# Patient Record
Sex: MALE | Race: WHITE | NOT HISPANIC OR LATINO | Employment: FULL TIME | URBAN - METROPOLITAN AREA
[De-identification: names, ages, dates, MRNs, and addresses within clinical notes are randomized per-mention and may not be internally consistent; named-entity substitution may affect disease eponyms.]

---

## 2020-01-15 ENCOUNTER — OFFICE VISIT CONVERTED (OUTPATIENT)
Dept: SURGERY | Facility: CLINIC | Age: 60
End: 2020-01-15
Attending: SURGERY

## 2020-01-15 ENCOUNTER — CONVERSION ENCOUNTER (OUTPATIENT)
Dept: SURGERY | Facility: CLINIC | Age: 60
End: 2020-01-15

## 2020-01-31 ENCOUNTER — HOSPITAL ENCOUNTER (OUTPATIENT)
Dept: PERIOP | Facility: HOSPITAL | Age: 60
Setting detail: HOSPITAL OUTPATIENT SURGERY
Discharge: HOME | End: 2020-01-31
Attending: SURGERY

## 2020-02-06 ENCOUNTER — OFFICE VISIT CONVERTED (OUTPATIENT)
Dept: SURGERY | Facility: CLINIC | Age: 60
End: 2020-02-06
Attending: SURGERY

## 2020-03-09 ENCOUNTER — HOSPITAL ENCOUNTER (INPATIENT)
Facility: HOSPITAL | Age: 60
LOS: 2 days | Discharge: HOME | End: 2020-03-11
Attending: INTERNAL MEDICINE | Admitting: INTERNAL MEDICINE

## 2020-03-09 ENCOUNTER — APPOINTMENT (OUTPATIENT)
Dept: CARDIOLOGY | Facility: HOSPITAL | Age: 60
End: 2020-03-09

## 2020-03-09 PROBLEM — I21.3 STEMI (ST ELEVATION MYOCARDIAL INFARCTION) (HCC): Status: ACTIVE | Noted: 2020-03-09

## 2020-03-09 PROBLEM — I21.11 ST ELEVATION MYOCARDIAL INFARCTION INVOLVING RIGHT CORONARY ARTERY: Status: ACTIVE | Noted: 2020-03-09

## 2020-03-09 LAB
ALBUMIN SERPL-MCNC: 4.1 G/DL (ref 3.5–5.2)
ALBUMIN/GLOB SERPL: 1.8 G/DL
ALP SERPL-CCNC: 76 U/L (ref 39–117)
ALT SERPL W P-5'-P-CCNC: 11 U/L (ref 1–41)
ANION GAP SERPL CALCULATED.3IONS-SCNC: 9.7 MMOL/L (ref 5–15)
AST SERPL-CCNC: 15 U/L (ref 1–40)
BASOPHILS # BLD AUTO: 0.06 10*3/MM3 (ref 0–0.2)
BASOPHILS NFR BLD AUTO: 0.6 % (ref 0–1.5)
BILIRUB SERPL-MCNC: 0.6 MG/DL (ref 0.2–1.2)
BUN BLD-MCNC: 10 MG/DL (ref 6–20)
BUN/CREAT SERPL: 11.6 (ref 7–25)
CALCIUM SPEC-SCNC: 8.9 MG/DL (ref 8.6–10.5)
CHLORIDE SERPL-SCNC: 103 MMOL/L (ref 98–107)
CHOLEST SERPL-MCNC: 168 MG/DL (ref 0–200)
CO2 SERPL-SCNC: 25.3 MMOL/L (ref 22–29)
CREAT BLD-MCNC: 0.86 MG/DL (ref 0.76–1.27)
DEPRECATED RDW RBC AUTO: 41.3 FL (ref 37–54)
EOSINOPHIL # BLD AUTO: 0.07 10*3/MM3 (ref 0–0.4)
EOSINOPHIL NFR BLD AUTO: 0.8 % (ref 0.3–6.2)
ERYTHROCYTE [DISTWIDTH] IN BLOOD BY AUTOMATED COUNT: 12.4 % (ref 12.3–15.4)
GFR SERPL CREATININE-BSD FRML MDRD: 92 ML/MIN/1.73
GLOBULIN UR ELPH-MCNC: 2.3 GM/DL
GLUCOSE BLD-MCNC: 91 MG/DL (ref 65–99)
HBA1C MFR BLD: 5.4 % (ref 4.8–5.6)
HCT VFR BLD AUTO: 39 % (ref 37.5–51)
HDLC SERPL-MCNC: 35 MG/DL (ref 40–60)
HGB BLD-MCNC: 13.5 G/DL (ref 13–17.7)
IMM GRANULOCYTES # BLD AUTO: 0.03 10*3/MM3 (ref 0–0.05)
IMM GRANULOCYTES NFR BLD AUTO: 0.3 % (ref 0–0.5)
LDLC SERPL CALC-MCNC: 109 MG/DL (ref 0–100)
LDLC/HDLC SERPL: 3.11 {RATIO}
LYMPHOCYTES # BLD AUTO: 1.19 10*3/MM3 (ref 0.7–3.1)
LYMPHOCYTES NFR BLD AUTO: 12.8 % (ref 19.6–45.3)
MAXIMAL PREDICTED HEART RATE: 163 BPM
MCH RBC QN AUTO: 31.6 PG (ref 26.6–33)
MCHC RBC AUTO-ENTMCNC: 34.6 G/DL (ref 31.5–35.7)
MCV RBC AUTO: 91.3 FL (ref 79–97)
MONOCYTES # BLD AUTO: 0.53 10*3/MM3 (ref 0.1–0.9)
MONOCYTES NFR BLD AUTO: 5.7 % (ref 5–12)
NEUTROPHILS # BLD AUTO: 7.43 10*3/MM3 (ref 1.7–7)
NEUTROPHILS NFR BLD AUTO: 79.8 % (ref 42.7–76)
NRBC BLD AUTO-RTO: 0 /100 WBC (ref 0–0.2)
PLATELET # BLD AUTO: 180 10*3/MM3 (ref 140–450)
PMV BLD AUTO: 9.5 FL (ref 6–12)
POTASSIUM BLD-SCNC: 4.3 MMOL/L (ref 3.5–5.2)
PROT SERPL-MCNC: 6.4 G/DL (ref 6–8.5)
RBC # BLD AUTO: 4.27 10*6/MM3 (ref 4.14–5.8)
SODIUM BLD-SCNC: 138 MMOL/L (ref 136–145)
STRESS TARGET HR: 139 BPM
TRIGL SERPL-MCNC: 120 MG/DL (ref 0–150)
TROPONIN T SERPL-MCNC: 0.03 NG/ML (ref 0–0.03)
TSH SERPL DL<=0.05 MIU/L-ACNC: 1.92 UIU/ML (ref 0.27–4.2)
VLDLC SERPL-MCNC: 24 MG/DL
WBC NRBC COR # BLD: 9.31 10*3/MM3 (ref 3.4–10.8)

## 2020-03-09 PROCEDURE — B2111ZZ FLUOROSCOPY OF MULTIPLE CORONARY ARTERIES USING LOW OSMOLAR CONTRAST: ICD-10-PCS | Performed by: INTERNAL MEDICINE

## 2020-03-09 PROCEDURE — 25010000002 HEPARIN (PORCINE) PER 1000 UNITS: Performed by: INTERNAL MEDICINE

## 2020-03-09 PROCEDURE — 84484 ASSAY OF TROPONIN QUANT: CPT | Performed by: INTERNAL MEDICINE

## 2020-03-09 PROCEDURE — 80053 COMPREHEN METABOLIC PANEL: CPT | Performed by: INTERNAL MEDICINE

## 2020-03-09 PROCEDURE — 83036 HEMOGLOBIN GLYCOSYLATED A1C: CPT | Performed by: INTERNAL MEDICINE

## 2020-03-09 PROCEDURE — 85025 COMPLETE CBC W/AUTO DIFF WBC: CPT | Performed by: INTERNAL MEDICINE

## 2020-03-09 PROCEDURE — 0 IOPAMIDOL PER 1 ML: Performed by: INTERNAL MEDICINE

## 2020-03-09 PROCEDURE — 93005 ELECTROCARDIOGRAM TRACING: CPT | Performed by: INTERNAL MEDICINE

## 2020-03-09 PROCEDURE — 027035Z DILATION OF CORONARY ARTERY, ONE ARTERY WITH TWO DRUG-ELUTING INTRALUMINAL DEVICES, PERCUTANEOUS APPROACH: ICD-10-PCS | Performed by: INTERNAL MEDICINE

## 2020-03-09 PROCEDURE — C1894 INTRO/SHEATH, NON-LASER: HCPCS | Performed by: INTERNAL MEDICINE

## 2020-03-09 PROCEDURE — 25010000002 ENOXAPARIN PER 10 MG: Performed by: INTERNAL MEDICINE

## 2020-03-09 PROCEDURE — 25010000003 LIDOCAINE 1 % SOLUTION: Performed by: INTERNAL MEDICINE

## 2020-03-09 PROCEDURE — 93458 L HRT ARTERY/VENTRICLE ANGIO: CPT | Performed by: INTERNAL MEDICINE

## 2020-03-09 PROCEDURE — 25010000002 BIVALIRUDIN TRIFLUOROACETATE 250 MG RECONSTITUTED SOLUTION 1 EACH VIAL: Performed by: INTERNAL MEDICINE

## 2020-03-09 PROCEDURE — 25010000002 MIDAZOLAM PER 1MG: Performed by: INTERNAL MEDICINE

## 2020-03-09 PROCEDURE — C1769 GUIDE WIRE: HCPCS | Performed by: INTERNAL MEDICINE

## 2020-03-09 PROCEDURE — 80061 LIPID PANEL: CPT | Performed by: INTERNAL MEDICINE

## 2020-03-09 PROCEDURE — C1874 STENT, COATED/COV W/DEL SYS: HCPCS | Performed by: INTERNAL MEDICINE

## 2020-03-09 PROCEDURE — C1725 CATH, TRANSLUMIN NON-LASER: HCPCS | Performed by: INTERNAL MEDICINE

## 2020-03-09 PROCEDURE — 93306 TTE W/DOPPLER COMPLETE: CPT

## 2020-03-09 PROCEDURE — 4A023N7 MEASUREMENT OF CARDIAC SAMPLING AND PRESSURE, LEFT HEART, PERCUTANEOUS APPROACH: ICD-10-PCS | Performed by: INTERNAL MEDICINE

## 2020-03-09 PROCEDURE — C9606 PERC D-E COR REVASC W AMI S: HCPCS | Performed by: INTERNAL MEDICINE

## 2020-03-09 PROCEDURE — 25010000002 FENTANYL CITRATE (PF) 100 MCG/2ML SOLUTION: Performed by: INTERNAL MEDICINE

## 2020-03-09 PROCEDURE — 84443 ASSAY THYROID STIM HORMONE: CPT | Performed by: INTERNAL MEDICINE

## 2020-03-09 PROCEDURE — 99152 MOD SED SAME PHYS/QHP 5/>YRS: CPT | Performed by: INTERNAL MEDICINE

## 2020-03-09 PROCEDURE — C1887 CATHETER, GUIDING: HCPCS | Performed by: INTERNAL MEDICINE

## 2020-03-09 PROCEDURE — 99153 MOD SED SAME PHYS/QHP EA: CPT | Performed by: INTERNAL MEDICINE

## 2020-03-09 DEVICE — XIENCE SIERRA™ EVEROLIMUS ELUTING CORONARY STENT SYSTEM 4.00 MM X 28 MM / RAPID-EXCHANGE
Type: IMPLANTABLE DEVICE | Status: FUNCTIONAL
Brand: XIENCE SIERRA™

## 2020-03-09 DEVICE — XIENCE SIERRA™ EVEROLIMUS ELUTING CORONARY STENT SYSTEM 4.00 MM X 08 MM / RAPID-EXCHANGE
Type: IMPLANTABLE DEVICE | Status: FUNCTIONAL
Brand: XIENCE SIERRA™

## 2020-03-09 RX ORDER — MIDAZOLAM HYDROCHLORIDE 2 MG/2ML
INJECTION, SOLUTION INTRAMUSCULAR; INTRAVENOUS AS NEEDED
Status: DISCONTINUED | OUTPATIENT
Start: 2020-03-09 | End: 2020-03-09 | Stop reason: HOSPADM

## 2020-03-09 RX ORDER — ACETAMINOPHEN 325 MG/1
650 TABLET ORAL EVERY 4 HOURS PRN
Status: DISCONTINUED | OUTPATIENT
Start: 2020-03-09 | End: 2020-03-11 | Stop reason: HOSPADM

## 2020-03-09 RX ORDER — ASPIRIN 81 MG/1
81 TABLET ORAL DAILY
Status: DISCONTINUED | OUTPATIENT
Start: 2020-03-10 | End: 2020-03-11 | Stop reason: HOSPADM

## 2020-03-09 RX ORDER — ALPRAZOLAM 0.5 MG/1
0.5 TABLET ORAL 2 TIMES DAILY PRN
Status: DISCONTINUED | OUTPATIENT
Start: 2020-03-09 | End: 2020-03-11 | Stop reason: HOSPADM

## 2020-03-09 RX ORDER — ASPIRIN 325 MG
TABLET ORAL AS NEEDED
Status: DISCONTINUED | OUTPATIENT
Start: 2020-03-09 | End: 2020-03-09 | Stop reason: HOSPADM

## 2020-03-09 RX ORDER — LIDOCAINE HYDROCHLORIDE 10 MG/ML
INJECTION, SOLUTION INFILTRATION; PERINEURAL AS NEEDED
Status: DISCONTINUED | OUTPATIENT
Start: 2020-03-09 | End: 2020-03-09 | Stop reason: HOSPADM

## 2020-03-09 RX ORDER — NICOTINE 21 MG/24HR
1 PATCH, TRANSDERMAL 24 HOURS TRANSDERMAL EVERY 24 HOURS
Status: DISCONTINUED | OUTPATIENT
Start: 2020-03-09 | End: 2020-03-11 | Stop reason: HOSPADM

## 2020-03-09 RX ORDER — RAMIPRIL 5 MG/1
5 CAPSULE ORAL
Status: DISCONTINUED | OUTPATIENT
Start: 2020-03-09 | End: 2020-03-11 | Stop reason: HOSPADM

## 2020-03-09 RX ORDER — ATORVASTATIN CALCIUM 80 MG/1
80 TABLET, FILM COATED ORAL NIGHTLY
Status: DISCONTINUED | OUTPATIENT
Start: 2020-03-09 | End: 2020-03-11 | Stop reason: HOSPADM

## 2020-03-09 RX ORDER — CLOPIDOGREL BISULFATE 75 MG/1
TABLET ORAL AS NEEDED
Status: DISCONTINUED | OUTPATIENT
Start: 2020-03-09 | End: 2020-03-09 | Stop reason: HOSPADM

## 2020-03-09 RX ORDER — CLOPIDOGREL BISULFATE 75 MG/1
75 TABLET ORAL DAILY
Status: DISCONTINUED | OUTPATIENT
Start: 2020-03-10 | End: 2020-03-11 | Stop reason: HOSPADM

## 2020-03-09 RX ORDER — HYDROCODONE BITARTRATE AND ACETAMINOPHEN 5; 325 MG/1; MG/1
1 TABLET ORAL EVERY 4 HOURS PRN
Status: DISCONTINUED | OUTPATIENT
Start: 2020-03-09 | End: 2020-03-11 | Stop reason: HOSPADM

## 2020-03-09 RX ORDER — ALPRAZOLAM 0.5 MG/1
0.5 TABLET ORAL 3 TIMES DAILY PRN
Status: DISCONTINUED | OUTPATIENT
Start: 2020-03-09 | End: 2020-03-11 | Stop reason: HOSPADM

## 2020-03-09 RX ORDER — FENTANYL CITRATE 50 UG/ML
INJECTION, SOLUTION INTRAMUSCULAR; INTRAVENOUS AS NEEDED
Status: DISCONTINUED | OUTPATIENT
Start: 2020-03-09 | End: 2020-03-09 | Stop reason: HOSPADM

## 2020-03-09 RX ORDER — SODIUM CHLORIDE 9 MG/ML
100 INJECTION, SOLUTION INTRAVENOUS CONTINUOUS
Status: DISCONTINUED | OUTPATIENT
Start: 2020-03-09 | End: 2020-03-10

## 2020-03-09 RX ADMIN — HYDROCODONE BITARTRATE AND ACETAMINOPHEN 1 TABLET: 5; 325 TABLET ORAL at 18:56

## 2020-03-09 RX ADMIN — RAMIPRIL 5 MG: 5 CAPSULE ORAL at 13:01

## 2020-03-09 RX ADMIN — ENOXAPARIN SODIUM 40 MG: 40 INJECTION SUBCUTANEOUS at 13:01

## 2020-03-09 RX ADMIN — NICOTINE 1 PATCH: 21 PATCH TRANSDERMAL at 08:23

## 2020-03-09 RX ADMIN — ATORVASTATIN CALCIUM 80 MG: 80 TABLET, FILM COATED ORAL at 20:44

## 2020-03-09 RX ADMIN — SODIUM CHLORIDE 100 ML/HR: 9 INJECTION, SOLUTION INTRAVENOUS at 19:46

## 2020-03-09 RX ADMIN — SODIUM CHLORIDE 100 ML/HR: 9 INJECTION, SOLUTION INTRAVENOUS at 08:22

## 2020-03-09 RX ADMIN — ACETAMINOPHEN 650 MG: 325 TABLET, FILM COATED ORAL at 14:55

## 2020-03-09 NOTE — PAYOR COMM NOTE
"  CLINICALS ATTACHED  PENDING AUTH [id number]    Alex Morris (59 y.o. Male)     Date of Birth Social Security Number Address Home Phone MRN    1960  [id number] [facility] [phone] [id number]    Yazidi Marital Status          None        Admission Date Admission Type Admitting Provider Attending Provider Department, Room/Bed    3/9/20 Urgent Avery Fan MD  [facility] INTENSIVE CARE, I08/1    Discharge Date Discharge Disposition Discharge Destination                       Attending Provider:  (none)   Allergies:  Lipitor [Atorvastatin Calcium], Rosuvastatin, Zocor [Simvastatin]    Isolation:  None   Infection:  None   Code Status:  CPR    Ht:  185.4 cm (73\")   Wt:  72.8 kg (160 lb 8 oz)    Admission Cmt:  None   Principal Problem:  None                Active Insurance as of 3/9/2020     Primary Coverage     Payor Plan Insurance Group Employer/Plan Group    [employer] PPO [id number]     Payor Plan Address Payor Plan Phone Number Payor Plan Fax Number Effective Dates    [address] [phone]  5/1/2019 - None Entered    [place] [address]       Subscriber Name Subscriber Birth Date Member ID       ALEX MORRIS 1960 [id number]                 Emergency Contacts      (Rel.) Home Phone Work Phone Mobile Phone    dario morris (Spouse) [phone] -- --            Insurance Information                [employer]HE[employer] BLUE CROSS/[employer]UE [employer]OSS [employer]UE [employer] PPO Phone: [phone]    Subscriber: Arturo Alex SWAPNIL Subscriber#: [id number]    Group#: [id number] Precert#:              History & Physical      Avery Fan MD at 03/09/20 1220          Provider, No Known      Patient Care Team:  Provider, No Known as PCP - General      History of present illness:   Middle-aged woman who started having severe pain in the central part of the chest while he was in his truck.  The symptoms kept getting " severe over the next 30 minutes he got diaphoretic and sweaty when he called emergency medical personnel.  Diagnosis of ST elevation myocardial infarction was performed.  Patient was brought into the Cath Lab.    Patient was seen prior to the procedure.  He was having 8 out of 10 chest pains at that point in time.    Review of Systems   Pertinent items are noted in HPI  Review of Systems      History  Baseline EKG: Not available.    LV function assessment: Pending.    CAD work-up: Cardiac catheterization many years ago told to be negative.  Stress test done December 2019-.    Active nicotine abuse-2 packs a day.    COPD.    Arthritis.      Personal history:    1 to 2 packs of cigarettes a day no history of alcohol consumption functional status the patient has been limited.  Mostly drives For living.    Family history:   Has family members with coronary artery disease.    Review of symptoms:    There is no cough cold fever chills nausea omitting diarrhea no abdominal pain loss of consciousness PND orthopnea stroke weakness joint swelling rest of the review symptoms are negative.    No past surgical history on file., No family history on file., Social History     Tobacco Use   • Smoking status: Current Some Day Smoker     Packs/day: 1.00     Types: Cigarettes     Start date: 3/9/1978   • Smokeless tobacco: Current User   Substance Use Topics   • Alcohol use: Not on file   • Drug use: Not on file   , No medications prior to admission.   , Scheduled Meds:    [START ON 3/10/2020] clopidogrel 75 mg Oral Daily   nicotine 1 patch Transdermal Q24H   , Continuous Infusions:    sodium chloride 100 mL/hr Last Rate: 100 mL/hr (03/09/20 0822)   , PRN Meds:  •  acetaminophen  •  ALPRAZolam  •  HYDROcodone-acetaminophen  •  influenza vaccine, Allergies:  Lipitor [atorvastatin calcium]; Rosuvastatin; and Zocor [simvastatin]     OBJECTIVE:    Vital Sign Min/Max for last 24 hours  Temp  Min: 97.3 °F (36.3 °C)  Max: 97.7 °F (36.5 °C)  "  BP  Min: 123/74  Max: 161/75   Pulse  Min: 54  Max: 77   Resp  Min: 18  Max: 19   SpO2  Min: 84 %  Max: 100 %   No data recorded   Weight  Min: 72.8 kg (160 lb 8 oz)  Max: 72.8 kg (160 lb 8 oz)     Flowsheet Rows      First Filed Value   Admission Height  185.4 cm (73\") Documented at 03/09/2020 0800   Admission Weight  72.8 kg (160 lb 8 oz) Documented at 03/09/2020 0800               Physical Exam:     General Appearance:    Alert, cooperative, in no acute distress   Head:    Normocephalic, without obvious abnormality, atraumatic   Eyes:            Lids and lashes normal, conjunctivae and sclerae normal, no   icterus, no pallor, corneas clear, PERRLA   Ears:    Ears appear intact with no abnormalities noted   Throat:   No oral lesions, no thrush, oral mucosa moist   Neck:   No adenopathy, supple, trachea midline, no thyromegaly, no   carotid bruit, no JVD   Back:     No kyphosis present, no scoliosis present, no skin lesions,      erythema or scars, no tenderness to percussion or                   palpation,   range of motion normal   Lungs:     Clear to auscultation,respirations regular, even and                  unlabored    Heart:    Regular rhythm and normal rate, normal S1 and S2, no            murmur, no gallop, no rub, no click   Chest Wall:    No abnormalities observed   Abdomen:     Normal bowel sounds, no masses, no organomegaly, soft        non-tender, non-distended, no guarding, no rebound                tenderness   Rectal:     Deferred   Extremities:   Moves all extremities well, no edema, no cyanosis, no             redness   Pulses:   Pulses palpable and equal bilaterally   Skin:   No bleeding, bruising or rash   Lymph nodes:   No palpable adenopathy   Neurologic:   Cranial nerves 2 - 12 grossly intact, sensation intact, DTR       present and equal bilaterally       Sinus rhythm ST elevation in the inferior leads in the septal leads.    LAB DATA :     Results from last 7 days   Lab Units " 03/09/20  0807   CHOLESTEROL mg/dL 168   TRIGLYCERIDES mg/dL 120   HDL CHOL mg/dL 35*   LDL CHOL mg/dL 109*       WBC   Date Value Ref Range Status   03/09/2020 9.31 3.40 - 10.80 10*3/mm3 Final     RBC   Date Value Ref Range Status   03/09/2020 4.27 4.14 - 5.80 10*6/mm3 Final     Hemoglobin   Date Value Ref Range Status   03/09/2020 13.5 13.0 - 17.7 g/dL Final     Hematocrit   Date Value Ref Range Status   03/09/2020 39.0 37.5 - 51.0 % Final     MCV   Date Value Ref Range Status   03/09/2020 91.3 79.0 - 97.0 fL Final     MCH   Date Value Ref Range Status   03/09/2020 31.6 26.6 - 33.0 pg Final     MCHC   Date Value Ref Range Status   03/09/2020 34.6 31.5 - 35.7 g/dL Final     RDW   Date Value Ref Range Status   03/09/2020 12.4 12.3 - 15.4 % Final     RDW-SD   Date Value Ref Range Status   03/09/2020 41.3 37.0 - 54.0 fl Final     MPV   Date Value Ref Range Status   03/09/2020 9.5 6.0 - 12.0 fL Final     Platelets   Date Value Ref Range Status   03/09/2020 180 140 - 450 10*3/mm3 Final     Neutrophil %   Date Value Ref Range Status   03/09/2020 79.8 (H) 42.7 - 76.0 % Final     Lymphocyte %   Date Value Ref Range Status   03/09/2020 12.8 (L) 19.6 - 45.3 % Final     Monocyte %   Date Value Ref Range Status   03/09/2020 5.7 5.0 - 12.0 % Final     Eosinophil %   Date Value Ref Range Status   03/09/2020 0.8 0.3 - 6.2 % Final     Basophil %   Date Value Ref Range Status   03/09/2020 0.6 0.0 - 1.5 % Final     Immature Grans %   Date Value Ref Range Status   03/09/2020 0.3 0.0 - 0.5 % Final     Neutrophils, Absolute   Date Value Ref Range Status   03/09/2020 7.43 (H) 1.70 - 7.00 10*3/mm3 Final     Lymphocytes, Absolute   Date Value Ref Range Status   03/09/2020 1.19 0.70 - 3.10 10*3/mm3 Final     Monocytes, Absolute   Date Value Ref Range Status   03/09/2020 0.53 0.10 - 0.90 10*3/mm3 Final     Eosinophils, Absolute   Date Value Ref Range Status   03/09/2020 0.07 0.00 - 0.40 10*3/mm3 Final     Basophils, Absolute   Date Value  Ref Range Status   03/09/2020 0.06 0.00 - 0.20 10*3/mm3 Final     Immature Grans, Absolute   Date Value Ref Range Status   03/09/2020 0.03 0.00 - 0.05 10*3/mm3 Final     nRBC   Date Value Ref Range Status   03/09/2020 0.0 0.0 - 0.2 /100 WBC Final       Lab Results   Component Value Date    GLUCOSE 91 03/09/2020    BUN 10 03/09/2020    CREATININE 0.86 03/09/2020    EGFRIFNONA 92 03/09/2020    BCR 11.6 03/09/2020    CO2 25.3 03/09/2020    CALCIUM 8.9 03/09/2020    ALBUMIN 4.10 03/09/2020    AST 15 03/09/2020    ALT 11 03/09/2020       Lab Results   Component Value Date    TROPONINT 0.027 03/09/2020       No results found for: DDIMER    No results found for: SITE, ALLENTEST, PHART, DJR1LAZ, PO2ART, REQ1KTF, BASEEXCESS, A6CKLASK, HGBBG, HCTABG, OXYHEMOGLOBI, METHHGBN, CARBOXYHGB, CO2CT, BAROMETRIC, MODALITY, FIO2  Lab Results   Component Value Date    HGBA1C 5.40 03/09/2020     Results from last 7 days   Lab Units 03/09/20  0807   TSH uIU/mL 1.920     No results found for: LIPASE    IMAGING DATA:     No results found.    ASSESSMENT PLAN:    DIAGNOSIS     #1 acute ST elevation myocardial infarction:  Patient has presented with an inferior posterior myocardial infarction.  He is open to the idea of invasive evaluation with intervention.  Risks and benefits have been explained.    2.  Risk profile:  Will be evaluated for the same after the procedure.  Based on the same will titrate medications.    3.  Active nicotine abuse:  Needs to be off the cigarettes.  Will be kept on nicotine patch during the hospital stay.    Estimate length of stay 1 to 2 days time.    Prognosis guarded.    CODE STATUS full.            ST elevation myocardial infarction involving right coronary artery (CMS/HCC)    STEMI (ST elevation myocardial infarction) (CMS/HCC)          I discussed the patients findings and my recommendations with patient    Avery Fan MD  03/09/20  12:20 PM get back to wor    Electronically signed by Meng  Avery Pritchard MD at 03/09/20 1224       Emergency Department Notes    No notes of this type exist for this encounter.           Physician Progress Notes (last 48 hours) (Notes from 03/07/20 1532 through 03/09/20 1532)    No notes of this type exist for this encounter.       Consult Notes (last 48 hours) (Notes from 03/07/20 1532 through 03/09/20 1532)    No notes of this type exist for this encounter.       Pre-procedure Diagnosis     STEMI       Conclusion     Procedure performed:     #1 coronary angiography.     2.  Left heart catheterization.     3.  Left angiography.     4.  Percutaneous intervention.     Date of procedure 3/9/2020.     History:     Middle-aged man presented with classic anginal symptoms and called EMS..  Patient was noted to have an ST elevation in the field was brought in for intervention with the diagnosis of ST elevation myocardial infarction.     Sedation used: 3 mg of Versed 75 mcg of fentanyl.     Administered and monitored by: Mike Ingram RN.     Procedure started at 6:58 AM.  Procedure completed at 7:30 AM.     Contrast used: Isovue-370 170 mL.     Radiation: 671 mGy.  Fluoroscopy time 7.1 minutes.     Access site-very distal right radial.  Hemostasis: Manual compression.     Procedure:     Access is obtained to the very distal right radial artery.  6 Occitan sheath was placed.  Tiger catheter was used right coronary angiography was performed left coronary angiography also was performed.  Pigtail catheter was used left enterography was performed after the intervention.  Gradient across arctic valve is measured hemostasis was achieved by deployment of manual compression.     Findings:     On fluoroscopy minimal calcification noted.     Left main coronary artery widely patent no atherosclerotic narrowing seen.     Left anterior descending coronary artery type II vessel mid section has a 30 to 40% narrowing to it at the takeoff of the diagonal the diagonal 1 has ostial narrowing of  90%.  Distal LAD has got a 50 to 70% narrowing to it.  JEANNETTE-3 flow noted.     Circumflex coronary artery: Has 1 obtuse marginal branch has luminal irregularities no area of critical stenosis.     Right coronary artery large caliber vessel occluded in its midsection at the takeoff of the acute marginal/RV branch.     Left ventricle: Overall LV systolic function appears to be maintained.  The inferior wall is mildly hypokinetic.  Ejection fraction is 55 to 60%.     Hemodynamics: LVEDP of 28 mmHg.  Ejection fraction of 55 to 60%.     Conclusions:     1.  Occluded dominant RCA in its midsection.     2.  LAD distal stenosis of 50 to 70% mid stenosis of 30 to 50%.     3.  Ostial diagonal 1 stenosis of 90% - 2.5 mm vessel.     4.  Normal LV systolic function with inferior wall hypokinesis and moderate elevation in left ventricular end-diastolic pressures.        Percutaneous intervention:     Procedure performed:     1.  Reopening of an occluded RCA.     2.  Stenting of the mid RCA.     Procedure.  JR 3.5 guide catheter was used right coronary artery was engaged cougar wire was advanced into the distal RCA.  Balloon angioplasty by 3 mm balloon was performed.  Subsequently a 4 x 28 mm and 4 x 8 mm in series drug-eluting stents were placed.  Subsequent angiography revealed excellent results with 0% remnant stenosis at the area of critical stenosis before.  Inflow outflow appeared to be excellent.     Conclusions:     1.  Successful reopening with stenting of the mid RCA [Dory stent 4 x 28 mm, 4 x 8 mm in series] reducing an occlusion to a remnant of 0% improving JEANNETTE 0 flow to JEANNETTE-3 flow.

## 2020-03-09 NOTE — PLAN OF CARE
Problem: Patient Care Overview  Goal: Plan of Care Review  Flowsheets (Taken 3/9/2020 1412)  Plan of Care Reviewed With: patient; spouse; family  Note:   Spoke with pt, his wife and children at his bedside.  I listened and provided support as pt talked about his experience of having a heart attack, the fast recognition of symptoms by [facility] staff and police who called for an ambulance, the [facility], and the care he has received in the ICU.  I asked the pt about his willingness to share his story with a member of our staff, and he stated that he would be glad for an opportunity to share his story.  He stated that he believes that God was present in the entire process and put everyone in place to provide the care he needed.  I will continue to follow.

## 2020-03-09 NOTE — PLAN OF CARE
Pt in NSR upon arrival to unit, site is clean dry and intact. Pt has had anxiety and disbelief with today's events. Offered xanax but refused. Pt has been up to chair walked around the unit 4 laps with no complaints. Voids per urinal. At shift change pt complaint of back pain and pain in hand . Got ekg notified Meng and gave a pain pill. All vitals remain stable. Pt would like to get meds to bed before discharge

## 2020-03-09 NOTE — H&P
Provider, No Known      Patient Care Team:  Provider, No Known as PCP - General      History of present illness:   Middle-aged woman who started having severe pain in the central part of the chest while he was in his truck.  The symptoms kept getting severe over the next 30 minutes he got diaphoretic and sweaty when he called emergency medical personnel.  Diagnosis of ST elevation myocardial infarction was performed.  Patient was brought into the Cath Lab.    Patient was seen prior to the procedure.  He was having 8 out of 10 chest pains at that point in time.    Review of Systems   Pertinent items are noted in HPI  Review of Systems      History  Baseline EKG: Not available.    LV function assessment: Pending.    CAD work-up: Cardiac catheterization many years ago told to be negative.  Stress test done December 2019-.    Active nicotine abuse-2 packs a day.    COPD.    Arthritis.      Personal history:    1 to 2 packs of cigarettes a day no history of alcohol consumption functional status the patient has been limited.  Mostly drives For living.    Family history:   Has family members with coronary artery disease.    Review of symptoms:    There is no cough cold fever chills nausea omitting diarrhea no abdominal pain loss of consciousness PND orthopnea stroke weakness joint swelling rest of the review symptoms are negative.    No past surgical history on file., No family history on file., Social History     Tobacco Use   • Smoking status: Current Some Day Smoker     Packs/day: 1.00     Types: Cigarettes     Start date: 3/9/1978   • Smokeless tobacco: Current User   Substance Use Topics   • Alcohol use: Not on file   • Drug use: Not on file   , No medications prior to admission.   , Scheduled Meds:    [START ON 3/10/2020] clopidogrel 75 mg Oral Daily   nicotine 1 patch Transdermal Q24H   , Continuous Infusions:    sodium chloride 100 mL/hr Last Rate: 100 mL/hr (03/09/20 0822)   , PRN Meds:  •  acetaminophen  •   "ALPRAZolam  •  HYDROcodone-acetaminophen  •  influenza vaccine, Allergies:  Lipitor [atorvastatin calcium]; Rosuvastatin; and Zocor [simvastatin]     OBJECTIVE:    Vital Sign Min/Max for last 24 hours  Temp  Min: 97.3 °F (36.3 °C)  Max: 97.7 °F (36.5 °C)   BP  Min: 123/74  Max: 161/75   Pulse  Min: 54  Max: 77   Resp  Min: 18  Max: 19   SpO2  Min: 84 %  Max: 100 %   No data recorded   Weight  Min: 72.8 kg (160 lb 8 oz)  Max: 72.8 kg (160 lb 8 oz)     Flowsheet Rows      First Filed Value   Admission Height  185.4 cm (73\") Documented at 03/09/2020 0800   Admission Weight  72.8 kg (160 lb 8 oz) Documented at 03/09/2020 0800               Physical Exam:     General Appearance:    Alert, cooperative, in no acute distress   Head:    Normocephalic, without obvious abnormality, atraumatic   Eyes:            Lids and lashes normal, conjunctivae and sclerae normal, no   icterus, no pallor, corneas clear, PERRLA   Ears:    Ears appear intact with no abnormalities noted   Throat:   No oral lesions, no thrush, oral mucosa moist   Neck:   No adenopathy, supple, trachea midline, no thyromegaly, no   carotid bruit, no JVD   Back:     No kyphosis present, no scoliosis present, no skin lesions,      erythema or scars, no tenderness to percussion or                   palpation,   range of motion normal   Lungs:     Clear to auscultation,respirations regular, even and                  unlabored    Heart:    Regular rhythm and normal rate, normal S1 and S2, no            murmur, no gallop, no rub, no click   Chest Wall:    No abnormalities observed   Abdomen:     Normal bowel sounds, no masses, no organomegaly, soft        non-tender, non-distended, no guarding, no rebound                tenderness   Rectal:     Deferred   Extremities:   Moves all extremities well, no edema, no cyanosis, no             redness   Pulses:   Pulses palpable and equal bilaterally   Skin:   No bleeding, bruising or rash   Lymph nodes:   No palpable " adenopathy   Neurologic:   Cranial nerves 2 - 12 grossly intact, sensation intact, DTR       present and equal bilaterally       Sinus rhythm ST elevation in the inferior leads in the septal leads.    LAB DATA :     Results from last 7 days   Lab Units 03/09/20  0807   CHOLESTEROL mg/dL 168   TRIGLYCERIDES mg/dL 120   HDL CHOL mg/dL 35*   LDL CHOL mg/dL 109*       WBC   Date Value Ref Range Status   03/09/2020 9.31 3.40 - 10.80 10*3/mm3 Final     RBC   Date Value Ref Range Status   03/09/2020 4.27 4.14 - 5.80 10*6/mm3 Final     Hemoglobin   Date Value Ref Range Status   03/09/2020 13.5 13.0 - 17.7 g/dL Final     Hematocrit   Date Value Ref Range Status   03/09/2020 39.0 37.5 - 51.0 % Final     MCV   Date Value Ref Range Status   03/09/2020 91.3 79.0 - 97.0 fL Final     MCH   Date Value Ref Range Status   03/09/2020 31.6 26.6 - 33.0 pg Final     MCHC   Date Value Ref Range Status   03/09/2020 34.6 31.5 - 35.7 g/dL Final     RDW   Date Value Ref Range Status   03/09/2020 12.4 12.3 - 15.4 % Final     RDW-SD   Date Value Ref Range Status   03/09/2020 41.3 37.0 - 54.0 fl Final     MPV   Date Value Ref Range Status   03/09/2020 9.5 6.0 - 12.0 fL Final     Platelets   Date Value Ref Range Status   03/09/2020 180 140 - 450 10*3/mm3 Final     Neutrophil %   Date Value Ref Range Status   03/09/2020 79.8 (H) 42.7 - 76.0 % Final     Lymphocyte %   Date Value Ref Range Status   03/09/2020 12.8 (L) 19.6 - 45.3 % Final     Monocyte %   Date Value Ref Range Status   03/09/2020 5.7 5.0 - 12.0 % Final     Eosinophil %   Date Value Ref Range Status   03/09/2020 0.8 0.3 - 6.2 % Final     Basophil %   Date Value Ref Range Status   03/09/2020 0.6 0.0 - 1.5 % Final     Immature Grans %   Date Value Ref Range Status   03/09/2020 0.3 0.0 - 0.5 % Final     Neutrophils, Absolute   Date Value Ref Range Status   03/09/2020 7.43 (H) 1.70 - 7.00 10*3/mm3 Final     Lymphocytes, Absolute   Date Value Ref Range Status   03/09/2020 1.19 0.70 - 3.10  10*3/mm3 Final     Monocytes, Absolute   Date Value Ref Range Status   03/09/2020 0.53 0.10 - 0.90 10*3/mm3 Final     Eosinophils, Absolute   Date Value Ref Range Status   03/09/2020 0.07 0.00 - 0.40 10*3/mm3 Final     Basophils, Absolute   Date Value Ref Range Status   03/09/2020 0.06 0.00 - 0.20 10*3/mm3 Final     Immature Grans, Absolute   Date Value Ref Range Status   03/09/2020 0.03 0.00 - 0.05 10*3/mm3 Final     nRBC   Date Value Ref Range Status   03/09/2020 0.0 0.0 - 0.2 /100 WBC Final       Lab Results   Component Value Date    GLUCOSE 91 03/09/2020    BUN 10 03/09/2020    CREATININE 0.86 03/09/2020    EGFRIFNONA 92 03/09/2020    BCR 11.6 03/09/2020    CO2 25.3 03/09/2020    CALCIUM 8.9 03/09/2020    ALBUMIN 4.10 03/09/2020    AST 15 03/09/2020    ALT 11 03/09/2020       Lab Results   Component Value Date    TROPONINT 0.027 03/09/2020       No results found for: DDIMER    No results found for: SITE, ALLENTEST, PHART, NKP6MMA, PO2ART, HMM6QEH, BASEEXCESS, D6GCKBBO, HGBBG, HCTABG, OXYHEMOGLOBI, METHHGBN, CARBOXYHGB, CO2CT, BAROMETRIC, MODALITY, FIO2  Lab Results   Component Value Date    HGBA1C 5.40 03/09/2020     Results from last 7 days   Lab Units 03/09/20  0807   TSH uIU/mL 1.920     No results found for: LIPASE    IMAGING DATA:     No results found.    ASSESSMENT PLAN:    DIAGNOSIS     #1 acute ST elevation myocardial infarction:  Patient has presented with an inferior posterior myocardial infarction.  He is open to the idea of invasive evaluation with intervention.  Risks and benefits have been explained.    2.  Risk profile:  Will be evaluated for the same after the procedure.  Based on the same will titrate medications.    3.  Active nicotine abuse:  Needs to be off the cigarettes.  Will be kept on nicotine patch during the hospital stay.    Estimate length of stay 1 to 2 days time.    Prognosis guarded.    CODE STATUS full.            ST elevation myocardial infarction involving right coronary artery  (CMS/HCC)    STEMI (ST elevation myocardial infarction) (CMS/HCC)          I discussed the patients findings and my recommendations with patient    Avery Fan MD  03/09/20  12:20 PM get back to wor

## 2020-03-10 LAB
ANION GAP SERPL CALCULATED.3IONS-SCNC: 10.3 MMOL/L (ref 5–15)
BUN BLD-MCNC: 10 MG/DL (ref 6–20)
BUN/CREAT SERPL: 14.5 (ref 7–25)
CALCIUM SPEC-SCNC: 8.4 MG/DL (ref 8.6–10.5)
CHLORIDE SERPL-SCNC: 109 MMOL/L (ref 98–107)
CO2 SERPL-SCNC: 23.7 MMOL/L (ref 22–29)
CREAT BLD-MCNC: 0.69 MG/DL (ref 0.76–1.27)
DEPRECATED RDW RBC AUTO: 40.4 FL (ref 37–54)
ERYTHROCYTE [DISTWIDTH] IN BLOOD BY AUTOMATED COUNT: 12.2 % (ref 12.3–15.4)
GFR SERPL CREATININE-BSD FRML MDRD: 118 ML/MIN/1.73
GLUCOSE BLD-MCNC: 106 MG/DL (ref 65–99)
HCT VFR BLD AUTO: 36 % (ref 37.5–51)
HGB BLD-MCNC: 12.4 G/DL (ref 13–17.7)
MCH RBC QN AUTO: 31.1 PG (ref 26.6–33)
MCHC RBC AUTO-ENTMCNC: 34.4 G/DL (ref 31.5–35.7)
MCV RBC AUTO: 90.2 FL (ref 79–97)
PLATELET # BLD AUTO: 163 10*3/MM3 (ref 140–450)
PMV BLD AUTO: 9.9 FL (ref 6–12)
POTASSIUM BLD-SCNC: 3.7 MMOL/L (ref 3.5–5.2)
RBC # BLD AUTO: 3.99 10*6/MM3 (ref 4.14–5.8)
SODIUM BLD-SCNC: 143 MMOL/L (ref 136–145)
TROPONIN T SERPL-MCNC: 0.16 NG/ML (ref 0–0.03)
WBC NRBC COR # BLD: 5.66 10*3/MM3 (ref 3.4–10.8)

## 2020-03-10 PROCEDURE — 85027 COMPLETE CBC AUTOMATED: CPT | Performed by: INTERNAL MEDICINE

## 2020-03-10 PROCEDURE — 93005 ELECTROCARDIOGRAM TRACING: CPT | Performed by: INTERNAL MEDICINE

## 2020-03-10 PROCEDURE — 80048 BASIC METABOLIC PNL TOTAL CA: CPT | Performed by: INTERNAL MEDICINE

## 2020-03-10 PROCEDURE — 84484 ASSAY OF TROPONIN QUANT: CPT | Performed by: INTERNAL MEDICINE

## 2020-03-10 PROCEDURE — 25010000002 ENOXAPARIN PER 10 MG: Performed by: INTERNAL MEDICINE

## 2020-03-10 RX ORDER — ASPIRIN 81 MG/1
81 TABLET ORAL DAILY
COMMUNITY
End: 2020-03-11 | Stop reason: HOSPADM

## 2020-03-10 RX ADMIN — ACETAMINOPHEN 650 MG: 325 TABLET, FILM COATED ORAL at 11:11

## 2020-03-10 RX ADMIN — ATORVASTATIN CALCIUM 80 MG: 80 TABLET, FILM COATED ORAL at 20:35

## 2020-03-10 RX ADMIN — ACETAMINOPHEN 650 MG: 325 TABLET, FILM COATED ORAL at 21:30

## 2020-03-10 RX ADMIN — ENOXAPARIN SODIUM 40 MG: 40 INJECTION SUBCUTANEOUS at 14:38

## 2020-03-10 RX ADMIN — SODIUM CHLORIDE 100 ML/HR: 9 INJECTION, SOLUTION INTRAVENOUS at 04:30

## 2020-03-10 RX ADMIN — SODIUM CHLORIDE 100 ML/HR: 9 INJECTION, SOLUTION INTRAVENOUS at 14:38

## 2020-03-10 RX ADMIN — ASPIRIN 81 MG: 81 TABLET, COATED ORAL at 08:38

## 2020-03-10 RX ADMIN — CLOPIDOGREL BISULFATE 75 MG: 75 TABLET ORAL at 08:38

## 2020-03-10 RX ADMIN — NICOTINE 1 PATCH: 21 PATCH TRANSDERMAL at 08:38

## 2020-03-10 RX ADMIN — RAMIPRIL 5 MG: 5 CAPSULE ORAL at 08:38

## 2020-03-10 NOTE — PLAN OF CARE
Problem: Patient Care Overview  Goal: Plan of Care Review  Outcome: Ongoing (interventions implemented as appropriate)  Flowsheets (Taken 3/9/2020 2145)  Progress: no change  Plan of Care Reviewed With: patient; spouse  Outcome Summary: pleasant patient with supportive wife at bedside-IV fluids infusing-medications per Dr. Fan-cath site right wrist-clean, dry, intact-gauze & tegaderm dressing-monitor labs and continue to monitor patient-possible discharge home 03-

## 2020-03-10 NOTE — NURSING NOTE
@ 2111 report received from ICU RN Elsa Rodriguez  @ 2143 patient transferred to room # 427 with wife at bedside and all belongings brought with patient-patient tolerated-will continue to monitor

## 2020-03-10 NOTE — PLAN OF CARE
Problem: Patient Care Overview  Goal: Plan of Care Review  Flowsheets (Taken 3/10/2020 2031)  Plan of Care Reviewed With: patient; spouse  Note:   Spoke with pt and his wife in his room and as he walked in the hallway.  Conversation during the visit centered on his gratitude for the life-saving events of yesterday.  I listened and provided support as he again described all of the care he received and how he feels that God was in control.  I will continue to follow.

## 2020-03-10 NOTE — PROGRESS NOTES
Discharge Planning Assessment  [facility]     Patient Name: Brennen Morris  MRN: [id number]  Today's Date: 3/10/2020    Admit Date: 3/9/2020    Discharge Needs Assessment     Row Name 03/10/20 1051       Living Environment    Lives With  grandchild(nancy);spouse    Name(s) of Who Lives With Patient  Debi    Current Living Arrangements  home/apartment/condo    Potentially Unsafe Housing Conditions  -- Pt denies any concerns.    Primary Care Provided by  self    Provides Primary Care For  no one    Family Caregiver if Needed  none    Quality of Family Relationships  supportive    Able to Return to Prior Arrangements  yes       Resource/Environmental Concerns    Resource/Environmental Concerns  none Pt denies any financial concerns for food, utilities and medications.       Transition Planning    Patient/Family Anticipates Transition to  home    Patient/Family Anticipated Services at Transition  none    Transportation Anticipated  family or friend will provide       Discharge Needs Assessment    Readmission Within the Last 30 Days  no previous admission in last 30 days    Concerns to be Addressed  denies needs/concerns at this time    Equipment Currently Used at Home  none    Anticipated Changes Related to Illness  none    Equipment Needed After Discharge  none    Provided Post Acute Provider List?  N/A    N/A Provider List Comment  Pt denies any needs.        Discharge Plan     Row Name 03/10/20 1118       Plan    Plan  Home    Patient/Family in Agreement with Plan  yes    Plan Comments Spoke to pt in room re DCP; his wife is at bedside.  Pt reports that he lives in a house with his wife and 2 grandchildren.  He is independent and plans to return home.  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  Pt denies HH and DME.  He can provide his own transportation.  Address, phone & PCP verfied.  Pt denies AD and declines information at this time.  CM will continue to follow.        Destination      Coordination has not been  started for this encounter.      Durable Medical Equipment      Coordination has not been started for this encounter.      Dialysis/Infusion      Coordination has not been started for this encounter.      Home Medical Care      Coordination has not been started for this encounter.      Therapy      Coordination has not been started for this encounter.      Community Resources      Coordination has not been started for this encounter.        Expected Discharge Date and Time     Expected Discharge Date Expected Discharge Time    Mar 11, 2020         Demographic Summary     Row Name 03/10/20 1048       General Information    Admission Type  inpatient    Arrived From  emergency department    Referral Source  admission list    Reason for Consult  discharge planning    Preferred Language  English     Used During This Interaction  no        Functional Status     Row Name 03/10/20 1049       Functional Status    Usual Activity Tolerance  good    Current Activity Tolerance  good       Functional Status, IADL    Medications  independent    Meal Preparation  independent    Housekeeping  independent    Laundry  independent    Shopping  independent       Mental Status    General Appearance WDL  WDL       Mental Status Summary    Recent Changes in Mental Status/Cognitive Functioning  no changes       Employment/    Employment Status  employed full time    Current or Previous Occupation  traveling/driving        Psychosocial    No documentation.       Abuse/Neglect    No documentation.       Legal    No documentation.       Substance Abuse    No documentation.       Patient Forms    No documentation.           Viviana Hatch RN

## 2020-03-11 VITALS
SYSTOLIC BLOOD PRESSURE: 143 MMHG | DIASTOLIC BLOOD PRESSURE: 78 MMHG | TEMPERATURE: 98.1 F | WEIGHT: 162.92 LBS | HEART RATE: 59 BPM | BODY MASS INDEX: 21.59 KG/M2 | OXYGEN SATURATION: 96 % | HEIGHT: 73 IN | RESPIRATION RATE: 18 BRPM

## 2020-03-11 PROBLEM — E78.5 DYSLIPIDEMIA: Chronic | Status: ACTIVE | Noted: 2020-03-11

## 2020-03-11 PROBLEM — I10 ESSENTIAL HYPERTENSION: Chronic | Status: ACTIVE | Noted: 2020-03-11

## 2020-03-11 PROBLEM — Z72.0 TOBACCO USE: Chronic | Status: ACTIVE | Noted: 2020-03-11

## 2020-03-11 RX ORDER — CLOPIDOGREL BISULFATE 75 MG/1
75 TABLET ORAL DAILY
Qty: 30 TABLET | Refills: 11 | Status: SHIPPED | OUTPATIENT
Start: 2020-03-11

## 2020-03-11 RX ORDER — RAMIPRIL 5 MG/1
5 CAPSULE ORAL
Qty: 30 CAPSULE | Refills: 11 | Status: SHIPPED | OUTPATIENT
Start: 2020-03-11

## 2020-03-11 RX ORDER — NICOTINE 21 MG/24HR
1 PATCH, TRANSDERMAL 24 HOURS TRANSDERMAL EVERY 24 HOURS
Qty: 28 PATCH | Refills: 1 | Status: SHIPPED | OUTPATIENT
Start: 2020-03-11

## 2020-03-11 RX ORDER — ASPIRIN 81 MG/1
81 TABLET ORAL DAILY
Qty: 30 TABLET | Refills: 11 | Status: SHIPPED | OUTPATIENT
Start: 2020-03-11

## 2020-03-11 RX ORDER — ATORVASTATIN CALCIUM 80 MG/1
80 TABLET, FILM COATED ORAL NIGHTLY
Qty: 30 TABLET | Refills: 11 | Status: SHIPPED | OUTPATIENT
Start: 2020-03-11

## 2020-03-11 RX ADMIN — CLOPIDOGREL BISULFATE 75 MG: 75 TABLET ORAL at 08:21

## 2020-03-11 RX ADMIN — ASPIRIN 81 MG: 81 TABLET, COATED ORAL at 08:20

## 2020-03-11 RX ADMIN — NICOTINE 1 PATCH: 21 PATCH TRANSDERMAL at 08:20

## 2020-03-11 RX ADMIN — RAMIPRIL 5 MG: 5 CAPSULE ORAL at 08:20

## 2020-03-11 NOTE — PLAN OF CARE
Problem: Patient Care Overview  Goal: Plan of Care Review  Outcome: Ongoing (interventions implemented as appropriate)  Flowsheets (Taken 3/10/2020 2000)  Progress: improving  Plan of Care Reviewed With: patient  Outcome Summary: pleasant patient with no complaint of pain at this time-supportive wife at bedside-no complaint of chest pain and no pain at cath site(right radial)-monitor labs and continue to monitor patient-possible discharge home 03-

## 2020-03-11 NOTE — PROGRESS NOTES
Case Management Discharge Note           Provided Post Acute Provider List?: N/A  N/A Provider List Comment: Pt denies any needs.    Destination      No service has been selected for the patient.      Durable Medical Equipment      No service has been selected for the patient.      Dialysis/Infusion      No service has been selected for the patient.      Home Medical Care      No service has been selected for the patient.      Therapy      No service has been selected for the patient.      Community Resources      No service has been selected for the patient.        Transportation Services  Private: Car    Final Discharge Disposition Code: 01 - home or self-care

## 2020-03-11 NOTE — DISCHARGE SUMMARY
Date of Discharge:  3/11/2020    Discharge Diagnosis:     #1 acute ST elevation myocardial infarction status post intervention.    2.  CAD mid/distal LAD narrowing of 50 to 70% medical therapy.    Active nicotine abuse.    Hypertension.    Baseline bradycardia.    Anxiety disorder.    Dyslipidemia.        Hospital Course  Patient is a 59 y.o. male presented with classic findings of acute ST elevation myocardial infarction went on to have intervention to the RCA with 2 drug-eluting stents with excellent results has done well post intervention is being discharged in a stable condition needs to stay on dual antiplatelet therapy for 1 full year.  Patient is from [place] is going to follow-up with a cardiologist in [place].    His cardiac rehab will be ordered nevertheless he most probably is going to follow-up and we will for the same.    He has been counseled on the need to quit smoking immediately expresses understanding.  Has been kept on nicotine patch in the hospital and on discharge.    He does have moderate disease involving the mid/distal LAD up to 70% severity.  This will be treated medically at this time.  Further work-up as per primary cardiology.    LV function seems to be maintained.  His blood pressures were minimally elevated has been started on low-dose ACE inhibitor therapy has tolerated it well.    His resting heart rates are in the 50s and 60s since beta-blocker has been put on hold for now.    His lipids are mildly abnormal has been put on high-dose statin therapy needs follow-up with blood work on outpatient basis.    Patient is cleared to resume his everyday activity and work from Monday, March 16 from a medical standpoint.  Procedures Performed  Procedure(s):  Left Heart Cath       Consults:   Consults     No orders found from 2/9/2020 to 3/10/2020.              Condition on Discharge:     Vital Signs  Temp:  [98 °F (36.7 °C)-98.8 °F (37.1 °C)] 98 °F (36.7 °C)  Heart Rate:  [59-73]  59  Resp:  [16-18] 18  BP: (119-149)/(51-73) 131/61    Physical Exam:     General Appearance:    Alert, cooperative, in no acute distress   Head:    Normocephalic, without obvious abnormality, atraumatic   Eyes:            Lids and lashes normal, conjunctivae and sclerae normal, no   icterus, no pallor, corneas clear, PERRLA   Ears:    Ears appear intact with no abnormalities noted   Throat:   No oral lesions, no thrush, oral mucosa moist   Neck:   No adenopathy, supple, trachea midline, no thyromegaly, no   carotid bruit, no JVD   Back:     No kyphosis present, no scoliosis present, no skin lesions,      erythema or scars, no tenderness to percussion or                   palpation,   range of motion normal   Lungs:     Clear to auscultation,respirations regular, even and                  unlabored    Heart:    Regular rhythm and normal rate, normal S1 and S2, no            murmur, no gallop, no rub, no click   Chest Wall:    No abnormalities observed   Abdomen:     Normal bowel sounds, no masses, no organomegaly, soft        non-tender, non-distended, no guarding, no rebound                tenderness   Rectal:     Deferred   Extremities:   Moves all extremities well, no edema, no cyanosis, no             redness   Pulses:   Pulses palpable and equal bilaterally   Skin:   No bleeding, bruising or rash   Lymph nodes:   No palpable adenopathy   Neurologic:   Cranial nerves 2 - 12 grossly intact, sensation intact, DTR       present and equal bilaterally       LAB DATA :     Results from last 7 days   Lab Units 03/09/20  0807   CHOLESTEROL mg/dL 168   TRIGLYCERIDES mg/dL 120   HDL CHOL mg/dL 35*   LDL CHOL mg/dL 109*       Laboratory results:    Results from last 7 days   Lab Units 03/10/20  0544 03/09/20  0807   SODIUM mmol/L 143 138   POTASSIUM mmol/L 3.7 4.3   CHLORIDE mmol/L 109* 103   CO2 mmol/L 23.7 25.3   BUN mg/dL 10 10   CREATININE mg/dL 0.69* 0.86   CALCIUM mg/dL 8.4* 8.9   BILIRUBIN mg/dL  --  0.6   ALK PHOS  U/L  --  76   ALT (SGPT) U/L  --  11   AST (SGOT) U/L  --  15   GLUCOSE mg/dL 106* 91     Results from last 7 days   Lab Units 03/10/20  0544 03/09/20  0807   WBC 10*3/mm3 5.66 9.31   HEMOGLOBIN g/dL 12.4* 13.5   HEMATOCRIT % 36.0* 39.0   PLATELETS 10*3/mm3 163 180                 Results from last 7 days   Lab Units 03/10/20  0544   TROPONIN T ng/mL 0.159*                 Lab Results   Component Value Date    HGBA1C 5.40 03/09/2020     Results from last 7 days   Lab Units 03/09/20  0807   TSH uIU/mL 1.920           IMAGING DATA:     No results found.    Discharge Disposition  Home or Self Care    Discharge Medications     Discharge Medications      New Medications      Instructions Start Date   atorvastatin 80 MG tablet  Commonly known as:  LIPITOR   80 mg, Oral, Nightly      clopidogrel 75 MG tablet  Commonly known as:  PLAVIX   75 mg, Oral, Daily      nicotine 21 MG/24HR patch  Commonly known as:  NICODERM CQ   1 patch, Transdermal, Every 24 Hours      ramipril 5 MG capsule  Commonly known as:  ALTACE   5 mg, Oral, Every 24 Hours Scheduled         Continue These Medications      Instructions Start Date   aspirin 81 MG EC tablet   81 mg, Oral, Daily             Discharge Diet:      Cardiac     Activity at Discharge:      As tolerated   Start work from March 16th Monday     Follow-up Appointments    Primary care in 2 weeks   Cardiology in 4 weeks time       Test Results Pending at Discharge       Avery Fan MD  03/11/20  07:48

## 2020-03-12 ENCOUNTER — READMISSION MANAGEMENT (OUTPATIENT)
Dept: CALL CENTER | Facility: HOSPITAL | Age: 60
End: 2020-03-12

## 2020-03-13 NOTE — OUTREACH NOTE
Prep Survey      Responses   [facility] facility patient discharged from?  [facility]   Is LACE score < 7 ?  Yes   Eligibility  Readm Mgmt   Discharge diagnosis  acute ST elevation myocardial infarction status post intervention.   Does the patient have one of the following disease processes/diagnoses(primary or secondary)?  Acute MI (STEMI,NSTEMI)   Does the patient have Home health ordered?  No   Is there a DME ordered?  No   Prep survey completed?  Yes          Kassandra Brenner RN

## 2020-03-19 ENCOUNTER — READMISSION MANAGEMENT (OUTPATIENT)
Dept: CALL CENTER | Facility: HOSPITAL | Age: 60
End: 2020-03-19

## 2020-03-19 NOTE — OUTREACH NOTE
AMI Week 2 Survey      Responses   [facility] [place] patient discharged from?  [facility]   Does the patient have one of the following disease processes/diagnoses(primary or secondary)?  Acute MI (STEMI,NSTEMI)   Week 2 attempt successful?  Yes   Call start time  1817   Call end time  1819   Discharge diagnosis  acute ST elevation myocardial infarction status post intervention.   Meds reviewed with patient/caregiver?  Yes   Is the patient having any side effects they believe may be caused by any medication additions or changes?  No   Does the patient have all prescriptions related to this admission filled (includes statins,anticoagulants,HTN meds,anti-arrhythmia meds)  Yes   Is the patient taking all medications as directed (includes completed medication regime)?  Yes   Does the patient have a primary care provider?   Yes   Does the patient have an appointment with their PCP,cardiologist,or clinic within 7 days of discharge?  No   What is preventing the patient from scheduling follow up appointments within 7 days of discharge?  Haven't had time   Nursing Interventions  Educated patient on importance of making appointment, Advised patient to make appointment   Has the patient kept scheduled appointments due by today?  N/A   Has home health visited the patient within 72 hours of discharge?  N/A   Psychosocial issues?  No   Comments  Patient reports he is doing well.    Did the patient receive a copy of their discharge instructions?  Yes   Nursing interventions  Reviewed instructions with patient   What is the patient's perception of their health status since discharge?  Improving   Nursing interventions  Nurse provided patient education   Is the patient/caregiver able to teach back signs and symptoms of when to call for help immediately:  Sudden chest discomfort, Shortness of breath at any time   Nursing interventions  Nurse provided patient education   Is the patient/caregiver able to teach back lifestyle changes to  help prevent MIs  Reducing stress   Is the patient/caregiver able to teach back ways to prevent a second heart attack:  Take medications, Follow up with MD, Manage risk factors   Is the patient/caregiver able to teach back the hierarchy of who to call/visit for symptoms/problems? PCP, Specialist, Home health nurse, Urgent Care, ED, 911  Yes   Week 2 call completed?  Yes   Revoked  No further contact(revokes)-requires comment   Graduated/Revoked comments  Declines further calls          Isaías Moreno RN

## 2021-05-15 VITALS — HEIGHT: 72 IN | WEIGHT: 169 LBS | BODY MASS INDEX: 22.89 KG/M2 | RESPIRATION RATE: 14 BRPM

## 2023-03-30 NOTE — PROGRESS NOTES
"Chief Complaint: Blood in Urine    Subjective         History of Present Illness  Brennen Morris is a 62 y.o. male presents to [facility] to be seen for hematuria.    Patient presented to his PCP on 2/6/2023 with persistent hematuria.  He reported to his PCP that his father passed away from bladder cancer.  His CT was negative for stones or bladder mass. Patient reports his blood has been on urinalysis and started 6 months to 1 year ago.     He also reports he was on tamsulosin for 3 months, he was not able to tell any difference in his urinary symptoms.    Frequency- admits    Urgency- admits    Incontinence- denies    GH- denies    Nocturia- denies    Stream- sometimes good and sometimes weak    History of kidney stones- last was 15 years- passed spontaneously x 1, prior was done by \"[facility]\"     surgeries- denies    Cardiopulmonary: MI    Anticoagulants: Clopidogrel, ASA 81 mg    Family history of  malignancy-father bladder cancer- diagnosed in late 70s    Smoker- 1 PPD- quit 3 weeks ago    UA micro  3/8/2023 RBCs 0-1  1/19/2023 RBCs 5-10    PSA  1/19/2023 1.1    CT abdomen pelvis without 2/1/2023 no radiodense urinary system calculus        Objective     Past Medical History:   Diagnosis Date   • Clotting disorder    • Hypertension        Past Surgical History:   Procedure Laterality Date   • CARDIAC CATHETERIZATION N/A 03/09/2020    Procedure: Left Heart Cath;  Surgeon: Avery Fan MD;  Location: [facility] CATH INVASIVE LOC[facility];  Service: Cardiovascular;  Laterality: N/A;   • CHOLECYSTECTOMY           Current Outpatient Medications:   •  nitroglycerin (NITROSTAT) 0.4 MG SL tablet, nitroglycerin 0.4 mg sublingual tablet, Disp: , Rfl:   •  aspirin 81 MG EC tablet, Take 1 tablet by mouth Daily., Disp: 30 tablet, Rfl: 11  •  atorvastatin (LIPITOR) 80 MG tablet, Take 1 tablet by mouth Every Night., Disp: 30 tablet, Rfl: 11  •  clopidogrel (PLAVIX) 75 MG tablet, Take 1 " "tablet by mouth Daily., Disp: 30 tablet, Rfl: 11  •  ezetimibe (ZETIA) 10 MG tablet, , Disp: , Rfl:   •  finasteride (PROSCAR) 5 MG tablet, Take 1 tablet by mouth Daily for 360 days., Disp: 90 tablet, Rfl: 3  •  nicotine (NICODERM CQ) 21 MG/24HR patch, Place 1 patch on the skin as directed by provider Daily., Disp: 28 patch, Rfl: 1  •  ramipril (ALTACE) 5 MG capsule, Take 1 capsule by mouth Daily., Disp: 30 capsule, Rfl: 11  •  rosuvastatin (CRESTOR) 40 MG tablet, , Disp: , Rfl:   •  tamsulosin (FLOMAX) 0.4 MG capsule 24 hr capsule, , Disp: , Rfl:   •  valsartan (DIOVAN) 160 MG tablet, , Disp: , Rfl:     Allergies   Allergen Reactions   • Hydrocodone Nausea And Vomiting   • Lipitor [Atorvastatin Calcium] Myalgia   • Rosuvastatin Myalgia   • Simvastatin Myalgia        History reviewed. No pertinent family history.    Social History     Socioeconomic History   • Marital status:    Tobacco Use   • Smoking status: Former     Packs/day: 1.00     Types: Cigarettes     Start date: 3/9/1978   • Smokeless tobacco: Never   Vaping Use   • Vaping Use: Never used       Vital Signs:   Resp 18   Ht 185.4 cm (73\")   Wt 80 kg (176 lb 6.4 oz)   BMI 23.27 kg/m²      Physical Exam  Vitals reviewed.   Constitutional:       Appearance: Normal appearance.   Neurological:      General: No focal deficit present.      Mental Status: He is alert and oriented to person, place, and time.   Psychiatric:         Mood and Affect: Mood normal.         Behavior: Behavior normal.          Result Review :   The following data was reviewed by: REYES Smith on 04/05/2023:       Bladder Scan interpretation 04/05/2023    Estimation of residual urine via [employer] 3000 Channelkit Bladder Scan  MA/nurse performing: Myla SALAS MA  Residual Urine: 7 ml  Indication: Hematuria, unspecified type    Benign prostatic hyperplasia with lower urinary tract symptoms, symptom details unspecified   Position: Supine  Examination: Incremental scanning " of the suprapubic area using 2.0 MHz transducer using copious amounts of acoustic gel.   Findings: An anechoic area was demonstrated which represented the bladder, with measurement of residual urine as noted. I inspected this myself. In that the residual urine was  insignificant, refer to plan for treatment and plan necessary at this time.           Procedures        Assessment and Plan    Diagnoses and all orders for this visit:    1. Hematuria, unspecified type (Primary)  -     Bladder Scan  -     POC Urinalysis Dipstick, Automated  -     CT Abdomen Pelvis With & Without Contrast; Future  -     Cystoscopy; Future    2. Benign prostatic hyperplasia with lower urinary tract symptoms, symptom details unspecified  -     finasteride (PROSCAR) 5 MG tablet; Take 1 tablet by mouth Daily for 360 days.  Dispense: 90 tablet; Refill: 3    BPH with Luts- behavioral modifications including fluid management, limiting fluids prior to sleep, and voiding immediately prior to sleep.      Continue conservative management of BPH Luts via behavioral modifications and medications; monitor for adverse outcomes of BPH which would warrant discussion of further management (ie hydronephrosis, recurrent uti, bladder stones, urinary retention, or renal insufficiency)     Offered to restart him on Flomax at double dose to see if this helped with his symptoms, but he preferred to trial finasteride.  Start finasteride 5 mg tab; side effects discussed; aware that it may take up to 6 months to have complete effect so encouraged patient to continue to take to ensure adequate trial of medication.    Will proceed with asymptomatic microhematuria workup per AUA guidelines including upper and lower urinary tract evaluations.  He does not have a significant smoking history though he did quit 3 weeks ago as well as his father did have bladder cancer.  Obtain CT scan with/without/and delayed imaging for upper tract evaluation; patient denies contrast  allergy.  Repeating CT scan since the images were not available to view and it was without contrast or delayed imaging because of his strong family history.  Patient to schedule cystoscopy with Dr. Robles for lower urinary tract evaluation after CT scan.  He does understand that if his CT scan shows any pathology we will change his cystoscopy to an in hospital cystoscopy instead of an office.     All questions addressed     Follow Up   Return in about 6 months (around 10/5/2023) for Cystoscopy with Travis, 6 months with Ange.  Patient was given instructions and counseling regarding his condition or for health maintenance advice. Please see specific information pulled into the AVS if appropriate.         This document has been electronically signed by REYES Smith  April 5, 2023 11:12 EDT

## 2023-04-05 ENCOUNTER — TELEPHONE (OUTPATIENT)
Dept: UROLOGY | Facility: CLINIC | Age: 63
End: 2023-04-05

## 2023-04-05 ENCOUNTER — TELEPHONE (OUTPATIENT)
Dept: SURGERY | Facility: CLINIC | Age: 63
End: 2023-04-05
Payer: COMMERCIAL

## 2023-04-05 ENCOUNTER — OFFICE VISIT (OUTPATIENT)
Dept: UROLOGY | Facility: CLINIC | Age: 63
End: 2023-04-05
Payer: COMMERCIAL

## 2023-04-05 VITALS — HEIGHT: 73 IN | RESPIRATION RATE: 18 BRPM | BODY MASS INDEX: 23.38 KG/M2 | WEIGHT: 176.4 LBS

## 2023-04-05 DIAGNOSIS — R31.9 HEMATURIA, UNSPECIFIED TYPE: Primary | ICD-10-CM

## 2023-04-05 DIAGNOSIS — N40.1 BENIGN PROSTATIC HYPERPLASIA WITH LOWER URINARY TRACT SYMPTOMS, SYMPTOM DETAILS UNSPECIFIED: ICD-10-CM

## 2023-04-05 PROBLEM — N40.0 BPH WITHOUT OBSTRUCTION/LOWER URINARY TRACT SYMPTOMS: Status: ACTIVE | Noted: 2023-04-05

## 2023-04-05 PROBLEM — R31.29 MICROSCOPIC HEMATURIA: Status: ACTIVE | Noted: 2023-04-05

## 2023-04-05 LAB
BILIRUB BLD-MCNC: NEGATIVE MG/DL
CLARITY, POC: CLEAR
COLOR UR: YELLOW
EXPIRATION DATE: ABNORMAL
GLUCOSE UR STRIP-MCNC: NEGATIVE MG/DL
KETONES UR QL: NEGATIVE
LEUKOCYTE EST, POC: NEGATIVE
Lab: ABNORMAL
NITRITE UR-MCNC: NEGATIVE MG/ML
PH UR: 5.5 [PH] (ref 5–8)
PROT UR STRIP-MCNC: NEGATIVE MG/DL
RBC # UR STRIP: ABNORMAL /UL
SP GR UR: 1.01 (ref 1–1.03)
SPECIMEN VOL SMN: 7 ML
UROBILINOGEN UR QL: ABNORMAL

## 2023-04-05 PROCEDURE — 1160F RVW MEDS BY RX/DR IN RCRD: CPT | Performed by: NURSE PRACTITIONER

## 2023-04-05 PROCEDURE — 1159F MED LIST DOCD IN RCRD: CPT | Performed by: NURSE PRACTITIONER

## 2023-04-05 PROCEDURE — 99204 OFFICE O/P NEW MOD 45 MIN: CPT | Performed by: NURSE PRACTITIONER

## 2023-04-05 PROCEDURE — 51798 US URINE CAPACITY MEASURE: CPT | Performed by: NURSE PRACTITIONER

## 2023-04-05 RX ORDER — VALSARTAN 160 MG/1
TABLET ORAL
COMMUNITY
Start: 2023-03-14

## 2023-04-05 RX ORDER — TAMSULOSIN HYDROCHLORIDE 0.4 MG/1
CAPSULE ORAL
COMMUNITY
Start: 2022-12-19 | End: 2023-04-05

## 2023-04-05 RX ORDER — EZETIMIBE 10 MG/1
TABLET ORAL
COMMUNITY
Start: 2023-03-15

## 2023-04-05 RX ORDER — FINASTERIDE 5 MG/1
5 TABLET, FILM COATED ORAL DAILY
Qty: 90 TABLET | Refills: 3 | Status: SHIPPED | OUTPATIENT
Start: 2023-04-05 | End: 2024-03-30

## 2023-04-05 RX ORDER — NITROGLYCERIN 0.4 MG/1
TABLET SUBLINGUAL
COMMUNITY
Start: 2023-01-19

## 2023-04-05 RX ORDER — ROSUVASTATIN CALCIUM 40 MG/1
TABLET, COATED ORAL
COMMUNITY
Start: 2022-12-16

## 2023-04-05 NOTE — TELEPHONE ENCOUNTER
Called and spoke with the patient to let him know that his medication Finasteride has been approved. Also let him know that I called [facility] to tell them and to have them rerun it through and they did and it was approved at no charge for a year, 12 refills. Approval code through [facility] is [id number]. I let him know that [facility] said that his medication will be ready at 4pm. Patient verbalized understanding and thanked me for calling and getting that done in a timely manner.

## 2023-04-05 NOTE — TELEPHONE ENCOUNTER
Patient was calling in regards to needing a PA for the medicine Graciela was prescribing, he was asking to speak to his staff. Please call patient.

## 2023-04-05 NOTE — TELEPHONE ENCOUNTER
Called and spoke with the patient and let him know that I have to have the [facility] so that I can work on getting an authorization from his insurance, that there is a special number on there that I have to have in order to do that. I let him know that once I get that denial from [facility] then I can work on getting it approved. Patient verbalized understanding.

## 2023-05-24 ENCOUNTER — HOSPITAL ENCOUNTER (OUTPATIENT)
Dept: CT IMAGING | Facility: HOSPITAL | Age: 63
Discharge: HOME | End: 2023-05-24
Payer: COMMERCIAL

## 2023-05-24 DIAGNOSIS — R31.9 HEMATURIA, UNSPECIFIED TYPE: ICD-10-CM

## 2023-05-24 LAB
CREAT BLDA-MCNC: 1.2 MG/DL
EGFRCR SERPLBLD CKD-EPI 2021: 69.2 ML/MIN/1.73

## 2023-05-24 PROCEDURE — 25510000001 IOPAMIDOL PER 1 ML: Performed by: NURSE PRACTITIONER

## 2023-05-24 PROCEDURE — 74178 CT ABD&PLV WO CNTR FLWD CNTR: CPT

## 2023-05-24 PROCEDURE — 82565 ASSAY OF CREATININE: CPT

## 2023-05-24 RX ADMIN — IOPAMIDOL 100 ML: 755 INJECTION, SOLUTION INTRAVENOUS at 17:29

## 2023-05-25 ENCOUNTER — TELEPHONE (OUTPATIENT)
Dept: UROLOGY | Facility: CLINIC | Age: 63
End: 2023-05-25
Payer: COMMERCIAL

## 2023-05-25 NOTE — TELEPHONE ENCOUNTER
Called and spoke with patient to let him know that his CT that he had yesterday did not show any negative findings to explain blood in his urine. Let him know that he is to keep his appointment with Dr. Roblse on 7-14-23 to have a cystoscopy done to rule out any causes for blood in his urine. Told patient that I would print out his appointment sheets and mail to him. Patient verbalized understanding and thanked me for calling.

## 2023-05-25 NOTE — PROGRESS NOTES
Please advise patient no cause for his hematuria was found on CT. He does have a simple cyst in his right kidney- no further workup needed on the cyst. He does need to have the cystoscopy as previously scheduled with Dr. Robles

## 2023-11-07 NOTE — PROGRESS NOTES
"Chief Complaint: Blood in Urine and Benign Prostatic Hypertrophy    Subjective         History of Present Illness  Brennen Morris is a 63 y.o. male presents to [facility] to be seen for follow-up.    Patient was previously seen by me with last visit on 4/5/2023 for hematuria.  He subsequently had both CT scan and cystoscopy which did not show any cause for his hematuria.  At that visit we also discussed that we could start him back on tamsulosin or start finasteride, he opted to start on finasteride.  He is here for follow-up.    He reports he never started finasteride. He does report frequency during the day but no nocturia, he does state he drinks a lot of fluids so he expects this to be the case.     Previous 4/5/2023:  Patient presented to his PCP on 2/6/2023 with persistent hematuria.  He reported to his PCP that his father passed away from bladder cancer.  His CT was negative for stones or bladder mass. Patient reports his blood has been on urinalysis and started 6 months to 1 year ago.      He also reports he was on tamsulosin for 3 months, he was not able to tell any difference in his urinary symptoms.     Frequency- admits   Urgency- admits   Incontinence- denies   GH- denies   Nocturia- denies   Stream- sometimes good and sometimes weak   History of kidney stones- last was 15 years- passed spontaneously x 1, prior was done by \"[facility]\"    surgeries- denies   Cardiopulmonary: MI   Anticoagulants: Clopidogrel, ASA 81 mg   Family history of  malignancy-father bladder cancer- diagnosed in late 70s   Smoker- 1 PPD- quit 3 weeks ago     UA micro  3/8/2023 RBCs 0-1  1/19/2023 RBCs 5-10     PSA  1/19/2023 1.1     CT abdomen pelvis without 2/1/2023 no radiodense urinary system calculus          Objective     Past Medical History:   Diagnosis Date    Clotting disorder     Hypertension        Past Surgical History:   Procedure Laterality Date    CARDIAC CATHETERIZATION N/A 03/09/2020 " "   Procedure: Left Heart Cath;  Surgeon: Avery Fan MD;  Location: [facility] CATH INVASIVE LOCATION;  Service: Cardiovascular;  Laterality: N/A;    CHOLECYSTECTOMY           Current Outpatient Medications:     aspirin 81 MG EC tablet, Take 1 tablet by mouth Daily., Disp: 30 tablet, Rfl: 11    atorvastatin (LIPITOR) 80 MG tablet, Take 1 tablet by mouth Every Night., Disp: 30 tablet, Rfl: 11    clopidogrel (PLAVIX) 75 MG tablet, Take 1 tablet by mouth Daily., Disp: 30 tablet, Rfl: 11    ezetimibe (ZETIA) 10 MG tablet, , Disp: , Rfl:     finasteride (PROSCAR) 5 MG tablet, Take 1 tablet by mouth Daily for 360 days., Disp: 90 tablet, Rfl: 3    nicotine (NICODERM CQ) 21 MG/24HR patch, Place 1 patch on the skin as directed by provider Daily., Disp: 28 patch, Rfl: 1    nitroglycerin (NITROSTAT) 0.4 MG SL tablet, nitroglycerin 0.4 mg sublingual tablet, Disp: , Rfl:     ramipril (ALTACE) 5 MG capsule, Take 1 capsule by mouth Daily., Disp: 30 capsule, Rfl: 11    rosuvastatin (CRESTOR) 40 MG tablet, , Disp: , Rfl:     valsartan (DIOVAN) 160 MG tablet, , Disp: , Rfl:     Allergies   Allergen Reactions    Hydrocodone Nausea And Vomiting    Lipitor [Atorvastatin Calcium] Myalgia    Rosuvastatin Myalgia    Simvastatin Myalgia        History reviewed. No pertinent family history.    Social History     Socioeconomic History    Marital status:    Tobacco Use    Smoking status: Former     Packs/day: 1     Types: Cigarettes     Start date: 3/9/1978     Passive exposure: Past    Smokeless tobacco: Never   Vaping Use    Vaping Use: Never used       Vital Signs:   /78 (BP Location: Right arm, Patient Position: Sitting, Cuff Size: Adult)   Pulse 78   Ht 185.4 cm (73\")   Wt 77.6 kg (171 lb)   BMI 22.56 kg/m²      Physical Exam  Vitals reviewed.   Constitutional:       Appearance: Normal appearance.   Neurological:      General: No focal deficit present.      Mental Status: He is alert and oriented to person, " place, and time.   Psychiatric:         Mood and Affect: Mood normal.         Behavior: Behavior normal.          Result Review :   The following data was reviewed by: REYES Smith on 11/08/2023:      Bladder Scan interpretation 11/08/2023    Estimation of residual urine via BVI 3000 Verathon Bladder Scan  MA/nurse performing: Myla SALAS MA  Residual Urine: 0 ml  Indication: Hematuria, unspecified type    Benign prostatic hyperplasia with lower urinary tract symptoms, symptom details unspecified   Position: Supine  Examination: Incremental scanning of the suprapubic area using 2.0 MHz transducer using copious amounts of acoustic gel.   Findings: An anechoic area was demonstrated which represented the bladder, with measurement of residual urine as noted. I inspected this myself. In that the residual urine was insignificant, refer to plan for treatment and plan necessary at this time.          Results for orders placed or performed in visit on 11/08/23   Bladder Scan   Result Value Ref Range    Urine Volume 0              Procedures        Assessment and Plan    Diagnoses and all orders for this visit:    1. Hematuria, unspecified type (Primary)  -     Bladder Scan  -     Urinalysis With Microscopic - Urine, Clean Catch; Future    2. Benign prostatic hyperplasia with lower urinary tract symptoms, symptom details unspecified  -     Bladder Scan    Given that he is not seeing any blood in his urine we will plan to repeat his microscopy in a year.  We did discuss that there is a segment of the population that has blood in the urine we never did find the cause.    Since he has not started on the finasteride and is having no bothersome urinary symptoms and a PVR of 0, do not have to treat his BPH symptoms at this time.    He will check his medications at home to ensure that he has not started on finasteride and if he has he will let me know.    Follow-up in 1 year with microscopy prior      Follow Up    Return in about 1 year (around 11/8/2024) for with urine microscopy prior.  Patient was given instructions and counseling regarding his condition or for health maintenance advice. Please see specific information pulled into the AVS if appropriate.         This document has been electronically signed by REYES Smith  November 8, 2023 10:23 EST

## 2023-11-08 ENCOUNTER — OFFICE VISIT (OUTPATIENT)
Dept: UROLOGY | Facility: CLINIC | Age: 63
End: 2023-11-08
Payer: COMMERCIAL

## 2023-11-08 VITALS
HEART RATE: 78 BPM | WEIGHT: 171 LBS | HEIGHT: 73 IN | SYSTOLIC BLOOD PRESSURE: 148 MMHG | DIASTOLIC BLOOD PRESSURE: 78 MMHG | BODY MASS INDEX: 22.66 KG/M2

## 2023-11-08 DIAGNOSIS — R31.9 HEMATURIA, UNSPECIFIED TYPE: Primary | ICD-10-CM

## 2023-11-08 DIAGNOSIS — N40.1 BENIGN PROSTATIC HYPERPLASIA WITH LOWER URINARY TRACT SYMPTOMS, SYMPTOM DETAILS UNSPECIFIED: ICD-10-CM

## 2023-11-08 LAB — URINE VOLUME: 0

## 2023-11-10 ENCOUNTER — TELEPHONE (OUTPATIENT)
Dept: UROLOGY | Facility: CLINIC | Age: 63
End: 2023-11-10
Payer: COMMERCIAL

## 2023-11-10 NOTE — TELEPHONE ENCOUNTER
Fanny from the hospital lab states she received a packing list for the patient for the urine sample from Myla but no sample she states she was not sure what happened. [facility] lab [phone]

## 2024-05-23 DIAGNOSIS — N40.1 BENIGN PROSTATIC HYPERPLASIA WITH LOWER URINARY TRACT SYMPTOMS, SYMPTOM DETAILS UNSPECIFIED: ICD-10-CM

## 2024-05-23 RX ORDER — FINASTERIDE 5 MG/1
5 TABLET, FILM COATED ORAL DAILY
Qty: 90 TABLET | Refills: 3 | Status: SHIPPED | OUTPATIENT
Start: 2024-05-23

## 2024-12-12 NOTE — PROGRESS NOTES
"   LOS: 1 day   Patient Care Team:  Ivan Delcid APRN as PCP - General (Family Medicine)        Interval History:   Patient has been feeling well has ambulated up and down the hallway has felt a lot better has no new symptoms at this time.    Review of Systems:   Pertinent items are noted in HPI.      OBJECTIVE:    Vital Sign Min/Max for last 24 hours  Temp  Min: 97.5 °F (36.4 °C)  Max: 98.5 °F (36.9 °C)   BP  Min: 119/51  Max: 139/60   Pulse  Min: 62  Max: 71   Resp  Min: 14  Max: 18   SpO2  Min: 94 %  Max: 99 %   No data recorded   Weight  Min: 73.7 kg (162 lb 8 oz)  Max: 73.7 kg (162 lb 8 oz)     Flowsheet Rows      First Filed Value   Admission Height  185.4 cm (73\") Documented at 03/09/2020 0800   Admission Weight  72.8 kg (160 lb 8 oz) Documented at 03/09/2020 0800          Physical Exam:     General Appearance:    Alert, cooperative, in no acute distress   Head:    Normocephalic, without obvious abnormality, atraumatic   Eyes:            Lids and lashes normal, conjunctivae and sclerae normal, no   icterus, no pallor, corneas clear, PERRLA   Ears:    Ears appear intact with no abnormalities noted   Throat:   No oral lesions, no thrush, oral mucosa moist   Neck:   No adenopathy, supple, trachea midline, no thyromegaly, no   carotid bruit, no JVD   Back:     No kyphosis present, no scoliosis present, no skin lesions,      erythema or scars, no tenderness to percussion or                   palpation,   range of motion normal   Lungs:     Clear to auscultation,respirations regular, even and                  unlabored    Heart:    Regular rhythm and normal rate, normal S1 and S2, no            murmur, no gallop, no rub, no click   Chest Wall:    No abnormalities observed   Abdomen:     Normal bowel sounds, no masses, no organomegaly, soft        non-tender, non-distended, no guarding, no rebound                tenderness   Rectal:     Deferred   Extremities:   Moves all extremities well, no edema, no " Patient returned your call. Please call her back at [phone]   cyanosis, no             redness   Pulses:   Pulses palpable and equal bilaterally   Skin:   No bleeding, bruising or rash   Lymph nodes:   No palpable adenopathy   Neurologic:   Cranial nerves 2 - 12 grossly intact, sensation intact, DTR       present and equal bilaterally            LAB DATA :     Results from last 7 days   Lab Units 03/09/20  0807   CHOLESTEROL mg/dL 168   TRIGLYCERIDES mg/dL 120   HDL CHOL mg/dL 35*   LDL CHOL mg/dL 109*       Laboratory results:    Results from last 7 days   Lab Units 03/10/20  0544 03/09/20  0807   SODIUM mmol/L 143 138   POTASSIUM mmol/L 3.7 4.3   CHLORIDE mmol/L 109* 103   CO2 mmol/L 23.7 25.3   BUN mg/dL 10 10   CREATININE mg/dL 0.69* 0.86   CALCIUM mg/dL 8.4* 8.9   BILIRUBIN mg/dL  --  0.6   ALK PHOS U/L  --  76   ALT (SGPT) U/L  --  11   AST (SGOT) U/L  --  15   GLUCOSE mg/dL 106* 91     Results from last 7 days   Lab Units 03/10/20  0544 03/09/20  0807   WBC 10*3/mm3 5.66 9.31   HEMOGLOBIN g/dL 12.4* 13.5   HEMATOCRIT % 36.0* 39.0   PLATELETS 10*3/mm3 163 180                 Results from last 7 days   Lab Units 03/10/20  0544   TROPONIN T ng/mL 0.159*                 Lab Results   Component Value Date    HGBA1C 5.40 03/09/2020     Results from last 7 days   Lab Units 03/09/20  0807   TSH uIU/mL 1.920           IMAGING DATA:     No results found.          ASSESSMENT PLAN:    #1 ST elevation myocardial infarction:  Patient is status post intervention to the RCA has done very well.  No new symptoms at this time has ambulated without any events.  Continue guideline directed medical therapy impressed upon the fact that needs to be on dual antiplatelet therapy for 1 full year.    2.  Coronary artery disease:  Has some disease involving the LAD and its distal aspect to be treated medically at this time.    3.  LV function assessment:  Overall LV systolic function appears to be normal.  He has been reassured about the same.    4.  Dyslipidemia:  Continue high-dose statin  therapy.    5.  Nicotine abuse:  Needs to get off the cigarettes right away.  He expresses understanding and promises to do the same.  Presently on a nicotine patch.    6.  Hypertension:  Good response to ACE inhibitor therapy continue the same for now.    Tentative discharge in a.m.  Patient to receive the CD of the heart catheterization as well as the echocardiogram during discharge.  Patient will be following up with cardiologist in [place].      ST elevation myocardial infarction involving right coronary artery (CMS/HCC)    STEMI (ST elevation myocardial infarction) (CMS/HCC)            Avery Fan MD  03/10/20  18:41

## 2025-01-10 ENCOUNTER — TELEPHONE (OUTPATIENT)
Dept: UROLOGY | Age: 65
End: 2025-01-10
Payer: COMMERCIAL

## 2025-01-16 NOTE — TELEPHONE ENCOUNTER
2ND CALL TO PT TO [facility] W/DAM[facility]/PT SAID THAT HE JUST GOT NEW INS AND ONCE HE GETS HIS NEW CARD HE WILL CALL US BACK TO SEE IF WE ARE IN NETWORK W/INS/ANYTHING ELSE TO DO??

## 2025-05-18 DIAGNOSIS — N40.1 BENIGN PROSTATIC HYPERPLASIA WITH LOWER URINARY TRACT SYMPTOMS, SYMPTOM DETAILS UNSPECIFIED: ICD-10-CM

## 2025-05-19 RX ORDER — FINASTERIDE 5 MG/1
5 TABLET, FILM COATED ORAL DAILY
Qty: 90 TABLET | Refills: 3 | OUTPATIENT
Start: 2025-05-19

## 2025-06-12 ENCOUNTER — OFFICE VISIT (OUTPATIENT)
Dept: ORTHOPEDIC SURGERY | Facility: CLINIC | Age: 65
End: 2025-06-12
Payer: COMMERCIAL

## 2025-06-12 VITALS
BODY MASS INDEX: 22.66 KG/M2 | WEIGHT: 171 LBS | DIASTOLIC BLOOD PRESSURE: 78 MMHG | OXYGEN SATURATION: 97 % | SYSTOLIC BLOOD PRESSURE: 121 MMHG | HEIGHT: 73 IN | HEART RATE: 77 BPM

## 2025-06-12 DIAGNOSIS — M54.16 LUMBAR RADICULOPATHY: ICD-10-CM

## 2025-06-12 DIAGNOSIS — M25.562 LEFT KNEE PAIN, UNSPECIFIED CHRONICITY: Primary | ICD-10-CM

## 2025-06-12 DIAGNOSIS — M25.552 LEFT HIP PAIN: ICD-10-CM

## 2025-06-12 RX ORDER — METHYLPREDNISOLONE 4 MG/1
TABLET ORAL
Qty: 21 TABLET | Refills: 0 | Status: SHIPPED | OUTPATIENT
Start: 2025-06-12

## 2025-06-12 NOTE — PROGRESS NOTES
"Chief Complaint  Pain and Initial Evaluation of the Left Hip       Subjective      Brennen Morris presents to [facility] for an evaluation  of his left hip. On 06/04/25 when he was in [place] when he noticed increase in pain to his left knee and left hip. He had prior surgery on his left knee forty years ago. He locates his pain to the low back region, hip region and his left knee. He denies any specific injury or falls to his knee. He has occasional numbness and tingling that radiates down his back. He has a stiffness and tightness with flexion  to his left knee.     Allergies   Allergen Reactions    Hydrocodone Nausea And Vomiting    Lipitor [Atorvastatin Calcium] Myalgia    Rosuvastatin Myalgia    Simvastatin Myalgia        Social History     Socioeconomic History    Marital status:    Tobacco Use    Smoking status: Former     Current packs/day: 1.00     Average packs/day: 1 pack/day for 47.3 years (47.3 ttl pk-yrs)     Types: Cigarettes     Start date: 3/9/1978     Passive exposure: Past    Smokeless tobacco: Never   Vaping Use    Vaping status: Never Used        I reviewed the patient's chief complaint, history of present illness, review of systems, past medical history, surgical history, family history, social history, medications, and allergy list.     Review of Systems     Constitutional: Denies fevers, chills, weight loss  Cardiovascular: Denies chest pain, shortness of breath  Skin: Denies rashes, acute skin changes  Neurologic: Denies headache, loss of consciousness  MSK: left hip pain       Vital Signs:   /78   Pulse 77   Ht 185.4 cm (73\")   Wt 77.6 kg (171 lb)   SpO2 97%   BMI 22.56 kg/m²            Ortho Exam    Physical Exam  General:Alert. No acute distress   Left lower extremity: non tender to the knee, no effusion, full knee extension, flexion  to 130 degrees to the knee, hip flexion  to 90 degrees, external rotation  to 35 degrees, internal " rotation to 25 degrees, pain with straight leg raise, non tender to the lateral  hip, tender to the lower lumbar region on the left side, calf soft, distal neurovascularly intact, positive  pulses, positive EHL, FHL, GS, and TA. Sensation intact to all 5 nerves of the foot.      Procedures    X-Ray Report:  Left hip X-Ray  Indication: Evaluation of left hip pain   AP/Lateral view(s)  Findings: mild degenerative changes to the left hip. No acute fracture or dislocation   Prior studies available for comparison: no       Imaging Results (Most Recent)       Procedure Component Value Units Date/Time    XR Hip With or Without Pelvis 2 - 3 View Left - In process [[id number]] Resulted: 06/12/25 0937     Updated: 06/12/25 0942    This result has not been signed. Information might be incomplete.               Result Review :       No results found.           Assessment and Plan     Diagnoses and all orders for this visit:    1. Left knee pain, unspecified chronicity (Primary)    2. Left hip pain  -     XR Hip With or Without Pelvis 2 - 3 View Left    3. Lumbar radiculopathy      The patient presents here today for an evaluation  of his left hip. X-rays were obtained in the office today and these were reviewed today.     MRI order placed today on his lumbar spine to evaluate for internal derangement.     Home exercises given today. Medrol dose daryl sent into the pharmacy today. He is not able to take anti inflammatories due to being on Plavix.     He will continue topical creams as needed.     Call or return if worsening symptoms.    Follow Up     After MRI     Patient was given instructions and counseling regarding his condition or for health maintenance advice. Please see specific information pulled into the AVS if appropriate.     Scribed for Felipe Alcantara MD by Mariah Hrenandez.  06/12/25   09:38 EDT    I have personally performed the services described in this document as scribed by the above individual and it is both  accurate and complete. Felipe Alcantara MD 06/12/25

## 2025-07-02 ENCOUNTER — TELEPHONE (OUTPATIENT)
Dept: ORTHOPEDIC SURGERY | Facility: CLINIC | Age: 65
End: 2025-07-02
Payer: COMMERCIAL

## 2025-07-02 NOTE — TELEPHONE ENCOUNTER
Caller: Brennen Morris    Relationship: Self    Best call back number: [phone]    What orders are you requesting (i.e. lab or imaging): MRI    In what timeframe would the patient need to come in: ASAP    Where will you receive your lab/imaging services: [facility] OFFICE    Additional notes: THE [place] LOCATION IS OUT OF NETWORK

## 2025-07-02 NOTE — TELEPHONE ENCOUNTER
LVM FOR PATIENT THAT ORDER HAS BEEN SENT TO [facility] AS REQUESTED -PLEASE CANCEL APPT WITH [facility]

## 2025-07-18 ENCOUNTER — TELEPHONE (OUTPATIENT)
Dept: ORTHOPEDIC SURGERY | Facility: CLINIC | Age: 65
End: 2025-07-18
Payer: COMMERCIAL

## 2025-07-18 NOTE — TELEPHONE ENCOUNTER
PER [facility], PT'S INSURANCE ONLY PAYS FOR IMAGING ONCE A YR. HE ALREADY HAD IMAGING PERFORMED THIS YEAR, SO INSURANCE WON'T PAY FOR MRI RECENTLY ORDERED. MRI WAS CANCELLED (PT UNABLE TO PAY OUT OF POCKET).

## 2025-08-14 ENCOUNTER — OFFICE VISIT (OUTPATIENT)
Dept: ORTHOPEDIC SURGERY | Facility: CLINIC | Age: 65
End: 2025-08-14
Payer: COMMERCIAL

## 2025-08-14 VITALS — WEIGHT: 171 LBS | HEIGHT: 73 IN | BODY MASS INDEX: 22.66 KG/M2

## 2025-08-14 DIAGNOSIS — M25.562 LEFT KNEE PAIN, UNSPECIFIED CHRONICITY: Primary | ICD-10-CM

## 2025-08-14 DIAGNOSIS — M17.12 PRIMARY OSTEOARTHRITIS OF LEFT KNEE: ICD-10-CM

## 2025-08-14 DIAGNOSIS — M54.16 LUMBAR RADICULOPATHY: ICD-10-CM

## 2025-08-14 RX ORDER — TRIAMCINOLONE ACETONIDE 40 MG/ML
40 INJECTION, SUSPENSION INTRA-ARTICULAR; INTRAMUSCULAR
Status: COMPLETED | OUTPATIENT
Start: 2025-08-14 | End: 2025-08-14

## 2025-08-14 RX ORDER — LIDOCAINE HYDROCHLORIDE 10 MG/ML
5 INJECTION, SOLUTION INFILTRATION; PERINEURAL
Status: COMPLETED | OUTPATIENT
Start: 2025-08-14 | End: 2025-08-14

## 2025-08-14 RX ADMIN — LIDOCAINE HYDROCHLORIDE 5 ML: 10 INJECTION, SOLUTION INFILTRATION; PERINEURAL at 10:42

## 2025-08-14 RX ADMIN — TRIAMCINOLONE ACETONIDE 40 MG: 40 INJECTION, SUSPENSION INTRA-ARTICULAR; INTRAMUSCULAR at 10:42

## (undated) DEVICE — ANGIOGRAPHIC CATHETER: Brand: EXPO™

## (undated) DEVICE — GW COUGAR XT HYDROTRACK JTIP 190CM

## (undated) DEVICE — GW EMR FIX EXCHG J STD .035 3MM 260CM

## (undated) DEVICE — TREK CORONARY DILATATION CATHETER 3.0 MM X 15 MM / RAPID-EXCHANGE: Brand: TREK

## (undated) DEVICE — RADIFOCUS OPTITORQUE ANGIOGRAPHIC CATHETER: Brand: OPTITORQUE

## (undated) DEVICE — GLIDESHEATH ACCESS KIT HYDROPHILIC COATED INTRODUCER SHEATH: Brand: GLIDESHEATH

## (undated) DEVICE — GUIDE CATHETER: Brand: MACH1™

## (undated) DEVICE — INFLATION DEVICE KIT: Brand: ENCORE™ 26 ADVANTAGE KIT